# Patient Record
Sex: MALE | Race: WHITE | NOT HISPANIC OR LATINO | URBAN - NONMETROPOLITAN AREA
[De-identification: names, ages, dates, MRNs, and addresses within clinical notes are randomized per-mention and may not be internally consistent; named-entity substitution may affect disease eponyms.]

---

## 2020-12-24 ENCOUNTER — IMMUNIZATIONS (OUTPATIENT)
Dept: FAMILY MEDICINE CLINIC | Facility: HOSPITAL | Age: 60
End: 2020-12-24

## 2020-12-24 DIAGNOSIS — Z23 ENCOUNTER FOR IMMUNIZATION: ICD-10-CM

## 2020-12-24 PROCEDURE — 0011A SARS-COV-2 / COVID-19 MRNA VACCINE (MODERNA) 100 MCG: CPT

## 2020-12-24 PROCEDURE — 91301 SARS-COV-2 / COVID-19 MRNA VACCINE (MODERNA) 100 MCG: CPT

## 2021-01-21 ENCOUNTER — IMMUNIZATIONS (OUTPATIENT)
Dept: FAMILY MEDICINE CLINIC | Facility: HOSPITAL | Age: 61
End: 2021-01-21

## 2021-01-21 DIAGNOSIS — Z23 ENCOUNTER FOR IMMUNIZATION: Primary | ICD-10-CM

## 2021-01-21 PROCEDURE — 91301 SARS-COV-2 / COVID-19 MRNA VACCINE (MODERNA) 100 MCG: CPT

## 2021-01-21 PROCEDURE — 0012A SARS-COV-2 / COVID-19 MRNA VACCINE (MODERNA) 100 MCG: CPT

## 2021-12-21 ENCOUNTER — IMMUNIZATIONS (OUTPATIENT)
Dept: FAMILY MEDICINE CLINIC | Facility: HOSPITAL | Age: 61
End: 2021-12-21

## 2021-12-21 DIAGNOSIS — Z23 ENCOUNTER FOR IMMUNIZATION: Primary | ICD-10-CM

## 2021-12-21 PROCEDURE — 0064A COVID-19 MODERNA VACC 0.25 ML BOOSTER: CPT

## 2021-12-21 PROCEDURE — 91306 COVID-19 MODERNA VACC 0.25 ML BOOSTER: CPT

## 2024-01-02 RX ORDER — TIZANIDINE HYDROCHLORIDE 2 MG/1
2 CAPSULE, GELATIN COATED ORAL DAILY PRN
COMMUNITY
Start: 2023-12-18

## 2024-01-02 RX ORDER — WARFARIN SODIUM 5 MG/1
TABLET ORAL
COMMUNITY
Start: 2023-12-18

## 2024-01-02 RX ORDER — OXYCODONE HYDROCHLORIDE AND ACETAMINOPHEN 5; 325 MG/1; MG/1
1 TABLET ORAL EVERY 6 HOURS PRN
COMMUNITY
End: 2024-01-03 | Stop reason: ALTCHOICE

## 2024-01-02 NOTE — PROGRESS NOTES
UROLOGY PROGRESS NOTE         NAME: Evans Devries  AGE: 63 y.o. SEX: male  : 1960   MRN: [id number]    DATE: 1/3/2024  TIME: 3:10 PM    Assessment and Plan   Procedures     Impression:   1. Nocturia  -     PSA Total, Diagnostic; Future    2. Acquired bilateral renal cysts  -     Ambulatory Referral to Urology         Plan: Plan is check PSA.  Recommend follow-up in 1 year with us or with his PCP for TARA PSA.    I think given the minimally complex left renal cyst I do not feel follow-up ultrasound is needed at this time.  Certainly if clinical situation changes with flank pain or gross hematuria which I doubt we certainly can reimage him.  Patient agrees with this plan      Chief Complaint     Chief Complaint   Patient presents with    Kidney Cyst     History of Present Illness     HPI: Evans Devries is a 63 y.o. year old male who presents with evaluation of bilateral renal cysts.  Patient had renal ultrasound at [facility] 2023 noted he had a 1.7 exophytic right lower pole renal cyst no solid component noted.  On the left there is a 2.7 cm left upper pole cyst there is noted a complex cyst 2 cm in the parapelvic region on the left no solid component.    Patient's creatinine is 1.1.  I do not see any recent PSAs    I reviewed the ultrasound with the patient he has a minimally complex cyst with some thin wall calcification in the left parapelvic region but no solid component I agree with the radiologist dictation.    Patient has no irritable or obstructive voiding complaints no previous  medical surgical history no family history of prostate cancer he is not sexually active currently.          The following portions of the patient's history were reviewed and updated as appropriate: allergies, current medications, past family history, past medical history, past social history, past surgical history and problem list.  Past Medical History:   Diagnosis Date    DVT (deep venous thrombosis) (HCC)   "   Dyslipidemia     Factor 5 Leiden mutation, heterozygous (HCC)     Does not know where it came from    Hypertension      Past Surgical History:   Procedure Laterality Date    MENISCECTOMY Left      shoulder  Review of Systems     Const: Denies chills, fever and weight loss.  CV: Denies chest pain.  Resp: Denies SOB.  GI: Denies abdominal pain, nausea and vomiting.  : Denies symptoms other than stated above.  Musculo: Denies back pain.    Objective   BP (!) 160/110 (BP Location: Left arm, Patient Position: Sitting, Cuff Size: Adult)   Pulse 70   Temp 98 °F (36.7 °C)   Ht 6' 1\" (1.854 m)   Wt (!) 158 kg (348 lb 6.4 oz)   SpO2 97%   BMI 45.97 kg/m²     Physical Exam  Const: Appears healthy and well developed. No signs of acute distress present.  Resp: Respirations are regular and unlabored.   CV: Rate is regular. Rhythm is regular.  Abdomen: Abdomen is soft, nontender, and nondistended. Kidneys are not palpable.  : Normal external genitalia exam prostate exam was benign feeling and smooth no masses or nausea  Psych: Patient's attitude is cooperative. Mood is normal. Affect is normal.    Current Medications     Current Outpatient Medications:     TiZANidine (ZANAFLEX) 2 MG capsule, Take 2 mg by mouth daily as needed, Disp: , Rfl:     warfarin (COUMADIN) 5 mg tablet, TAKE 2 TO 3 TABLETS BY MOUTH DAILY OR  AS  DIRECTED  BY  ANTI-COAGULATION CLINIC, Disp: , Rfl:         Vipin Robbins MD        "

## 2024-01-03 ENCOUNTER — APPOINTMENT (OUTPATIENT)
Dept: LAB | Facility: HOSPITAL | Age: 64
End: 2024-01-03
Payer: COMMERCIAL

## 2024-01-03 ENCOUNTER — CONSULT (OUTPATIENT)
Dept: UROLOGY | Facility: CLINIC | Age: 64
End: 2024-01-03
Payer: COMMERCIAL

## 2024-01-03 VITALS
HEART RATE: 70 BPM | TEMPERATURE: 98 F | HEIGHT: 73 IN | SYSTOLIC BLOOD PRESSURE: 160 MMHG | BODY MASS INDEX: 41.75 KG/M2 | WEIGHT: 315 LBS | DIASTOLIC BLOOD PRESSURE: 110 MMHG | OXYGEN SATURATION: 97 %

## 2024-01-03 DIAGNOSIS — R35.1 NOCTURIA: ICD-10-CM

## 2024-01-03 DIAGNOSIS — N28.1 ACQUIRED BILATERAL RENAL CYSTS: ICD-10-CM

## 2024-01-03 DIAGNOSIS — R35.1 NOCTURIA: Primary | ICD-10-CM

## 2024-01-03 LAB — PSA SERPL-MCNC: 0.68 NG/ML (ref 0–4)

## 2024-01-03 PROCEDURE — 84153 ASSAY OF PSA TOTAL: CPT

## 2024-01-03 PROCEDURE — 36415 COLL VENOUS BLD VENIPUNCTURE: CPT

## 2024-01-03 PROCEDURE — 99204 OFFICE O/P NEW MOD 45 MIN: CPT | Performed by: UROLOGY

## 2024-11-14 ENCOUNTER — TELEPHONE (OUTPATIENT)
Dept: UROLOGY | Facility: CLINIC | Age: 64
End: 2024-11-14

## 2024-11-14 NOTE — TELEPHONE ENCOUNTER
Pt returning call to reschedule appt. Pt scheduled for next available with Dr Robbins on 5/9/25. Pt asking if any sooner appts available.    Pt call back- [phone]

## 2025-04-30 NOTE — PROGRESS NOTES
UROLOGY PROGRESS NOTE         NAME: Evans Devries  AGE: 64 y.o. SEX: male  : 1960   MRN: [id number]    DATE: 2025  TIME: 7:27 AM    Assessment and Plan   Procedures     Impression:   1. Nocturia  2. Renal cyst  3. Complex renal cyst       Plan: We did discuss with the patient his most recent PSA that stable he is asymptomatic.  He looks like he passed a 1 mm right UVJ calculi.  I told the patient if his symptoms would come back to give me a call otherwise we will see him back in 1 year for yearly TARA PSA.  It is his first stone and the CT did not show any other calculi therefore I recommend just a low oxalate diet sheet he agrees with this plan.      Chief Complaint   No chief complaint on file.    History of Present Illness     HPI: Evans Devries is a 64 y.o. year old male who presents with follow-up from OV 1/3/2024.  Patient with a history of renal cysts and nocturia.  Per my note last year patient with a minimally complex left renal cyst and I did not feel it needed follow-up ultrasound.  He also had some simple right renal cyst.    I recommended a PSA last year and it was 0.68 on 1/3/2024.  Patient here for yearly follow-up and prostate cancer screening.    His PSA on 2025 was 0.5  Patient had CT scan done on 5/3/2025 results are pending.  Apparently this was for acute flank pain on my review I did not see any stones.    Patient was having right flank pain was even in [place].  I reviewed the CAT scan with the patient showed a 1 to 2 mm right UVJ stone.  Patient thinks he passed that he is asymptomatic now he likely did.  He has no voiding symptoms he does have mild ED but is never tried any of the oral medications but may want to consider in the future.  I did discuss with him how to take it.    The following portions of the patient's history were reviewed and updated as appropriate: allergies, current medications, past family history, past medical history, past social history, past  surgical history and problem list.  Past Medical History:   Diagnosis Date    DVT (deep venous thrombosis) (HCC)     Dyslipidemia     Factor 5 Leiden mutation, heterozygous (HCC)     Does not know where it came from    Hypertension      Past Surgical History:   Procedure Laterality Date    MENISCECTOMY Left      shoulder  Review of Systems     Const: Denies chills, fever and weight loss.  CV: Denies chest pain.  Resp: Denies SOB.  GI: Denies abdominal pain, nausea and vomiting.  : Denies symptoms other than stated above.  Musculo: Denies back pain.    Objective   There were no vitals taken for this visit.    Physical Exam  Const: Appears healthy and well developed. No signs of acute distress present.  Resp: Respirations are regular and unlabored.   CV: Rate is regular. Rhythm is regular.  Abdomen: Abdomen is soft, nontender, and nondistended. Kidneys are not palpable.  : Normal external genitalia exam no flank pain prostate benign feeling smooth no masses or nodules  Psych: Patient's attitude is cooperative. Mood is normal. Affect is normal.    Current Medications     Current Outpatient Medications:     TiZANidine (ZANAFLEX) 2 MG capsule, Take 2 mg by mouth daily as needed, Disp: , Rfl:     warfarin (COUMADIN) 5 mg tablet, TAKE 2 TO 3 TABLETS BY MOUTH DAILY OR  AS  DIRECTED  BY  ANTI-COAGULATION CLINIC, Disp: , Rfl:         Vipin Robbins MD

## 2025-05-05 ENCOUNTER — TELEPHONE (OUTPATIENT)
Dept: UROLOGY | Facility: CLINIC | Age: 65
End: 2025-05-05

## 2025-05-05 DIAGNOSIS — R35.1 NOCTURIA: Primary | ICD-10-CM

## 2025-05-05 RX ORDER — HYDROCODONE BITARTRATE AND ACETAMINOPHEN 5; 325 MG/1; MG/1
1 TABLET ORAL
COMMUNITY
Start: 2025-05-03 | End: 2025-05-06

## 2025-05-05 RX ORDER — ATORVASTATIN CALCIUM 10 MG/1
10 TABLET, FILM COATED ORAL DAILY
COMMUNITY

## 2025-05-05 NOTE — TELEPHONE ENCOUNTER
Telephone call with patient as reminder to get PSA done prior to appointment. Patient is aware and understanding

## 2025-05-06 ENCOUNTER — APPOINTMENT (OUTPATIENT)
Dept: LAB | Facility: CLINIC | Age: 65
End: 2025-05-06
Payer: COMMERCIAL

## 2025-05-06 DIAGNOSIS — R35.1 NOCTURIA: ICD-10-CM

## 2025-05-06 LAB — PSA SERPL-MCNC: 0.54 NG/ML (ref 0–4)

## 2025-05-06 PROCEDURE — 36415 COLL VENOUS BLD VENIPUNCTURE: CPT

## 2025-05-06 PROCEDURE — 84153 ASSAY OF PSA TOTAL: CPT

## 2025-05-09 ENCOUNTER — OFFICE VISIT (OUTPATIENT)
Dept: UROLOGY | Facility: CLINIC | Age: 65
End: 2025-05-09
Payer: COMMERCIAL

## 2025-05-09 VITALS
WEIGHT: 315 LBS | HEART RATE: 66 BPM | HEIGHT: 73 IN | DIASTOLIC BLOOD PRESSURE: 90 MMHG | SYSTOLIC BLOOD PRESSURE: 156 MMHG | BODY MASS INDEX: 41.75 KG/M2 | OXYGEN SATURATION: 97 % | TEMPERATURE: 97.2 F

## 2025-05-09 DIAGNOSIS — R35.1 NOCTURIA: Primary | ICD-10-CM

## 2025-05-09 DIAGNOSIS — N28.1 RENAL CYST: ICD-10-CM

## 2025-05-09 DIAGNOSIS — N28.1 COMPLEX RENAL CYST: ICD-10-CM

## 2025-05-09 PROCEDURE — 99214 OFFICE O/P EST MOD 30 MIN: CPT | Performed by: UROLOGY

## 2025-05-09 NOTE — PATIENT INSTRUCTIONS
The foods you wanted try to moderate or avoid include black or brown ice tea, try to avoid Coke or Pepsi but ginger ale and Sprite are okay.  Also try to avoid high amounts of peanut butter, milk chocolate, asparagus.